# Patient Record
Sex: FEMALE | Race: WHITE | NOT HISPANIC OR LATINO | URBAN - METROPOLITAN AREA
[De-identification: names, ages, dates, MRNs, and addresses within clinical notes are randomized per-mention and may not be internally consistent; named-entity substitution may affect disease eponyms.]

---

## 2021-04-06 ENCOUNTER — BULK ORDERING (OUTPATIENT)
Dept: CASE MANAGEMENT | Facility: OTHER | Age: 51
End: 2021-04-06

## 2021-04-06 DIAGNOSIS — Z23 IMMUNIZATION DUE: ICD-10-CM

## 2022-02-21 ENCOUNTER — PRE-PROCEDURE SCREENING (OUTPATIENT)
Dept: GASTROENTEROLOGY | Facility: CLINIC | Age: 52
End: 2022-02-21

## 2022-02-21 ENCOUNTER — PREP FOR SURGERY (OUTPATIENT)
Dept: OTHER | Facility: HOSPITAL | Age: 52
End: 2022-02-21

## 2022-02-21 DIAGNOSIS — Z12.11 ENCOUNTER FOR SCREENING FOR MALIGNANT NEOPLASM OF COLON: Primary | ICD-10-CM

## 2022-02-21 RX ORDER — SODIUM CHLORIDE, SODIUM LACTATE, POTASSIUM CHLORIDE, CALCIUM CHLORIDE 600; 310; 30; 20 MG/100ML; MG/100ML; MG/100ML; MG/100ML
30 INJECTION, SOLUTION INTRAVENOUS CONTINUOUS
Status: CANCELLED | OUTPATIENT
Start: 2022-04-18

## 2022-02-21 NOTE — TELEPHONE ENCOUNTER
Colonoscopy screening-- No personal history of polyps-- No family history of polyps or colon cancer--Aspirin--Medications:        aspirin 81 MG chewable tablet  escitalopram (LEXAPRO) 5 MG tablet  levothyroxine (SYNTHROID, LEVOTHROID) 100 MCG tablet  valACYclovir (VALTREX) 1000 MG tablet  Fish-Oil  Lexapro  Basa  D3  Women Multivitamin  Vit C  Tumeric          aspirin 81 MG chewable tablet    escitalopram (LEXAPRO) 5 MG tablet    levothyroxine (SYNTHROID, LEVOTHROID) 100 MCG tablet    valACYclovir (VALTREX) 1000 MG tablet

## 2022-02-24 ENCOUNTER — PREP FOR SURGERY (OUTPATIENT)
Dept: OTHER | Facility: HOSPITAL | Age: 52
End: 2022-02-24

## 2022-03-22 ENCOUNTER — TELEPHONE (OUTPATIENT)
Dept: GASTROENTEROLOGY | Facility: CLINIC | Age: 52
End: 2022-03-22

## 2022-03-22 NOTE — TELEPHONE ENCOUNTER
Pt called this afternoon, just curious, she and  sent in procedure papers at the same time.  Her  has already been scheduled for his procedure.  She was wondering when she would be scheduled ?

## 2022-03-25 PROBLEM — Z12.11 ENCOUNTER FOR SCREENING FOR MALIGNANT NEOPLASM OF COLON: Status: ACTIVE | Noted: 2022-03-25

## 2022-03-29 ENCOUNTER — TELEPHONE (OUTPATIENT)
Dept: GASTROENTEROLOGY | Facility: CLINIC | Age: 52
End: 2022-03-29

## 2022-03-29 NOTE — TELEPHONE ENCOUNTER
I have been trying to reach pt to get updated insurance. If patient calls back please get insurance.

## 2022-04-18 ENCOUNTER — ANESTHESIA (OUTPATIENT)
Dept: GASTROENTEROLOGY | Facility: HOSPITAL | Age: 52
End: 2022-04-18

## 2022-04-18 ENCOUNTER — ANESTHESIA EVENT (OUTPATIENT)
Dept: GASTROENTEROLOGY | Facility: HOSPITAL | Age: 52
End: 2022-04-18

## 2022-04-18 ENCOUNTER — HOSPITAL ENCOUNTER (OUTPATIENT)
Facility: HOSPITAL | Age: 52
Setting detail: HOSPITAL OUTPATIENT SURGERY
Discharge: HOME | End: 2022-04-18
Attending: INTERNAL MEDICINE | Admitting: INTERNAL MEDICINE

## 2022-04-18 VITALS
HEART RATE: 59 BPM | BODY MASS INDEX: 24.82 KG/M2 | OXYGEN SATURATION: 98 % | HEIGHT: 64 IN | SYSTOLIC BLOOD PRESSURE: 106 MMHG | WEIGHT: 145.4 LBS | RESPIRATION RATE: 16 BRPM | DIASTOLIC BLOOD PRESSURE: 66 MMHG

## 2022-04-18 DIAGNOSIS — Z12.11 ENCOUNTER FOR SCREENING FOR MALIGNANT NEOPLASM OF COLON: ICD-10-CM

## 2022-04-18 LAB
B-HCG UR QL: NEGATIVE
EXPIRATION DATE: NORMAL
INTERNAL NEGATIVE CONTROL: NORMAL
INTERNAL POSITIVE CONTROL: NORMAL
Lab: NORMAL

## 2022-04-18 PROCEDURE — 45378 DIAGNOSTIC COLONOSCOPY: CPT | Performed by: INTERNAL MEDICINE

## 2022-04-18 PROCEDURE — 25010000002 PROPOFOL 10 MG/ML EMULSION: Performed by: ANESTHESIOLOGY

## 2022-04-18 PROCEDURE — 81025 URINE PREGNANCY TEST: CPT | Performed by: INTERNAL MEDICINE

## 2022-04-18 RX ORDER — PROPOFOL 10 MG/ML
VIAL (ML) INTRAVENOUS CONTINUOUS PRN
Status: DISCONTINUED | OUTPATIENT
Start: 2022-04-18 | End: 2022-04-18 | Stop reason: SURG

## 2022-04-18 RX ORDER — SODIUM CHLORIDE, SODIUM LACTATE, POTASSIUM CHLORIDE, CALCIUM CHLORIDE 600; 310; 30; 20 MG/100ML; MG/100ML; MG/100ML; MG/100ML
30 INJECTION, SOLUTION INTRAVENOUS CONTINUOUS
Status: DISCONTINUED | OUTPATIENT
Start: 2022-04-18 | End: 2022-04-18 | Stop reason: HOSPADM

## 2022-04-18 RX ORDER — LIDOCAINE HYDROCHLORIDE 20 MG/ML
INJECTION, SOLUTION INFILTRATION; PERINEURAL AS NEEDED
Status: DISCONTINUED | OUTPATIENT
Start: 2022-04-18 | End: 2022-04-18 | Stop reason: SURG

## 2022-04-18 RX ORDER — PROPOFOL 10 MG/ML
VIAL (ML) INTRAVENOUS AS NEEDED
Status: DISCONTINUED | OUTPATIENT
Start: 2022-04-18 | End: 2022-04-18 | Stop reason: SURG

## 2022-04-18 RX ADMIN — PROPOFOL 40 MG: 10 INJECTION, EMULSION INTRAVENOUS at 09:53

## 2022-04-18 RX ADMIN — SODIUM CHLORIDE, POTASSIUM CHLORIDE, SODIUM LACTATE AND CALCIUM CHLORIDE 30 ML/HR: 600; 310; 30; 20 INJECTION, SOLUTION INTRAVENOUS at 09:45

## 2022-04-18 RX ADMIN — PROPOFOL 100 MG: 10 INJECTION, EMULSION INTRAVENOUS at 09:51

## 2022-04-18 RX ADMIN — LIDOCAINE HYDROCHLORIDE 60 MG: 20 INJECTION, SOLUTION INFILTRATION; PERINEURAL at 09:50

## 2022-04-18 RX ADMIN — Medication 200 MCG/KG/MIN: at 09:51

## 2022-04-18 NOTE — H&P
[facility]  Pre Procedure History & Physical    Chief Complaint:   Time for my colonoscopy    Subjective     HPI:   51 y.o. female presenting for average risk screening.  No prior cscope.  No current GI issues.  No family hx of colon cancer or polyps.        Past Medical History:   Past Medical History:   Diagnosis Date   • Anxiety    • Disease of thyroid gland     hypothyroidism       Family History:  No family history on file.    Social History:   reports that she has never smoked. She does not have any smokeless tobacco history on file. She reports current alcohol use.    Medications:   Medications Prior to Admission   Medication Sig Dispense Refill Last Dose   • aspirin 81 MG chewable tablet Chew 81 mg daily.      • escitalopram (LEXAPRO) 5 MG tablet Take 5 mg by mouth daily.      • levothyroxine (SYNTHROID, LEVOTHROID) 100 MCG tablet Take 100 mcg by mouth daily.      • valACYclovir (VALTREX) 1000 MG tablet 2 PO Q12 4 tablet 5        Allergies:  Patient has no known allergies.    ROS:    Pertinent items are noted in HPI     Objective     not currently breastfeeding.    Physical Exam   Constitutional: Pt is oriented to person, place, and time and well-developed, well-nourished, and in no distress.   Abdominal: Soft.   Psychiatric: Mood, memory, affect and judgment normal.     Assessment/Plan     Diagnosis:  Encounter for screening for colon cancer    Anticipated Surgical Procedure:  Colonoscopy    The risks, benefits, and alternatives of this procedure have been discussed with the patient or the responsible party- the patient understands and agrees to proceed.

## 2022-04-18 NOTE — ANESTHESIA POSTPROCEDURE EVALUATION
"Patient: Ana Smith    Procedure Summary     Date: 04/18/22 Room / Location:  [facility]    Anesthesia Start: 0947 Anesthesia Stop: 1011    Procedure: COLONOSCOPY INTO CECUM (N/A ) Diagnosis:       Encounter for screening for malignant neoplasm of colon      (Encounter for screening for malignant neoplasm of colon [Z12.11])    Surgeons: Saqib Calle MD Provider: Jonah Tolentino MD    Anesthesia Type: MAC ASA Status: 2          Anesthesia Type: MAC    Vitals  Vitals Value Taken Time   BP 99/60 04/18/22 1013   Temp     Pulse 58 04/18/22 1013   Resp 16 04/18/22 1013   SpO2 97 % 04/18/22 1013           Post Anesthesia Care and Evaluation    Patient location during evaluation: bedside  Patient participation: complete - patient participated  Level of consciousness: awake and alert  Pain management: adequate  Airway patency: patent  Anesthetic complications: No anesthetic complications  PONV Status: none  Cardiovascular status: acceptable  Respiratory status: acceptable  Hydration status: acceptable    Comments: BP 99/60 (BP Location: Left arm, Patient Position: Lying)   Pulse 58   Resp 16   Ht 162.6 cm (64\")   Wt 66 kg (145 lb 6.4 oz)   SpO2 97%   BMI 24.96 kg/m²         "

## 2022-04-18 NOTE — ANESTHESIA PREPROCEDURE EVALUATION
Anesthesia Evaluation     Patient summary reviewed and Nursing notes reviewed   no history of anesthetic complications:  NPO Solid Status: > 8 hours  NPO Liquid Status: > 8 hours           Airway   Mallampati: II  TM distance: >3 FB  Neck ROM: full  No difficulty expected  Dental - normal exam     Pulmonary    (-) rhonchi, decreased breath sounds, wheezes, not a smoker  Cardiovascular   Exercise tolerance: good (4-7 METS)    Rhythm: regular  Rate: normal    (-) hypertension, CAD, angina, TEJADA, murmur      Neuro/Psych  (+) psychiatric history Anxiety,    (-) CVA  GI/Hepatic/Renal/Endo    (+)   thyroid problem hypothyroidism  (-) no renal disease, diabetes    Musculoskeletal     Abdominal     Abdomen: soft.   Substance History      OB/GYN          Other                        Anesthesia Plan    ASA 2     MAC   total IV anesthesia  intravenous induction     Anesthetic plan, all risks, benefits, and alternatives have been provided, discussed and informed consent has been obtained with: patient.        CODE STATUS:

## 2024-12-23 ENCOUNTER — OFFICE VISIT (OUTPATIENT)
Dept: FAMILY MEDICINE CLINIC | Facility: CLINIC | Age: 54
End: 2024-12-23
Payer: COMMERCIAL

## 2024-12-23 VITALS
OXYGEN SATURATION: 96 % | TEMPERATURE: 97.3 F | HEIGHT: 64 IN | DIASTOLIC BLOOD PRESSURE: 66 MMHG | BODY MASS INDEX: 21.44 KG/M2 | SYSTOLIC BLOOD PRESSURE: 110 MMHG | HEART RATE: 72 BPM | WEIGHT: 125.6 LBS

## 2024-12-23 DIAGNOSIS — Z00.00 HEALTHCARE MAINTENANCE: ICD-10-CM

## 2024-12-23 DIAGNOSIS — E78.00 PURE HYPERCHOLESTEROLEMIA: ICD-10-CM

## 2024-12-23 DIAGNOSIS — E04.2 MULTIPLE THYROID NODULES: ICD-10-CM

## 2024-12-23 DIAGNOSIS — F41.9 ANXIETY: ICD-10-CM

## 2024-12-23 DIAGNOSIS — Z23 NEED FOR VACCINATION: ICD-10-CM

## 2024-12-23 DIAGNOSIS — E06.3 HYPOTHYROIDISM DUE TO HASHIMOTO THYROIDITIS: Primary | ICD-10-CM

## 2024-12-23 PROBLEM — D68.61 APL (ANTIPHOSPHOLIPID SYNDROME): Status: ACTIVE | Noted: 2024-12-23

## 2024-12-23 PROBLEM — B00.2 ORAL HERPES: Status: ACTIVE | Noted: 2024-12-23

## 2024-12-23 PROCEDURE — 90715 TDAP VACCINE 7 YRS/> IM: CPT | Performed by: STUDENT IN AN ORGANIZED HEALTH CARE EDUCATION/TRAINING PROGRAM

## 2024-12-23 PROCEDURE — 90471 IMMUNIZATION ADMIN: CPT | Performed by: STUDENT IN AN ORGANIZED HEALTH CARE EDUCATION/TRAINING PROGRAM

## 2024-12-23 PROCEDURE — 99204 OFFICE O/P NEW MOD 45 MIN: CPT | Performed by: STUDENT IN AN ORGANIZED HEALTH CARE EDUCATION/TRAINING PROGRAM

## 2024-12-23 RX ORDER — FLUTICASONE PROPIONATE 50 MCG
2 SPRAY, SUSPENSION (ML) NASAL DAILY
COMMUNITY

## 2024-12-23 RX ORDER — CETIRIZINE HYDROCHLORIDE 10 MG/1
10 TABLET ORAL DAILY
COMMUNITY

## 2024-12-23 RX ORDER — ASPIRIN 81 MG/1
81 TABLET ORAL DAILY
COMMUNITY

## 2024-12-23 RX ORDER — LEVOTHYROXINE SODIUM 100 UG/1
100 TABLET ORAL DAILY
COMMUNITY

## 2024-12-23 RX ORDER — LEVOTHYROXINE SODIUM 200 UG/1
TABLET ORAL
COMMUNITY
Start: 2024-11-05 | End: 2024-12-23
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
## 2025-01-02 DIAGNOSIS — E83.51 HYPOCALCEMIA: ICD-10-CM

## 2025-01-02 DIAGNOSIS — R79.89 LOW SERUM TOTAL PROTEIN LEVEL: Primary | ICD-10-CM

## 2025-01-03 DIAGNOSIS — E83.51 HYPOCALCEMIA: ICD-10-CM

## 2025-01-03 DIAGNOSIS — R79.89 LOW SERUM TOTAL PROTEIN LEVEL: ICD-10-CM

## 2025-01-07 PROBLEM — J30.89 NON-SEASONAL ALLERGIC RHINITIS: Status: ACTIVE | Noted: 2025-01-07

## 2025-01-07 PROBLEM — E04.2 MULTIPLE THYROID NODULES: Status: ACTIVE | Noted: 2025-01-07

## 2025-01-14 LAB
25(OH)D3+25(OH)D2 SERPL-MCNC: 124 NG/ML (ref 30–100)
ALBUMIN SERPL-MCNC: 4.7 G/DL (ref 3.8–4.9)
ALBUMIN/CREAT UR: <6 MG/G CREAT (ref 0–29)
APPEARANCE UR: CLEAR
BILIRUB UR QL STRIP: NEGATIVE
CA-I SERPL ISE-MCNC: 4.9 MG/DL (ref 4.5–5.6)
CALCIUM SERPL-MCNC: 9.2 MG/DL (ref 8.7–10.2)
COLOR UR: YELLOW
CREAT SERPL-MCNC: 0.88 MG/DL (ref 0.57–1)
CREAT UR-MCNC: 52.7 MG/DL
EGFRCR SERPLBLD CKD-EPI 2021: 79 ML/MIN/1.73
GLOBULIN SER CALC-MCNC: 2.2 G/DL (ref 1.5–4.5)
GLUCOSE UR QL STRIP: NEGATIVE
HGB UR QL STRIP: NEGATIVE
INTACT PTH: NORMAL
KETONES UR QL STRIP: NEGATIVE
LEUKOCYTE ESTERASE UR QL STRIP: NEGATIVE
MAGNESIUM SERPL-MCNC: 2.1 MG/DL (ref 1.6–2.3)
MICRO URNS: NORMAL
MICROALBUMIN UR-MCNC: <3 UG/ML
NITRITE UR QL STRIP: NEGATIVE
PH UR STRIP: 6.5 [PH] (ref 5–7.5)
PHOSPHATE SERPL-MCNC: 4.1 MG/DL (ref 3–4.3)
PROT SERPL-MCNC: 6.9 G/DL (ref 6–8.5)
PROT UR QL STRIP: NEGATIVE
PTH-INTACT SERPL-MCNC: 28 PG/ML (ref 15–65)
SP GR UR STRIP: 1.01 (ref 1–1.03)
UROBILINOGEN UR STRIP-MCNC: 0.2 MG/DL (ref 0.2–1)

## 2025-01-27 ENCOUNTER — OFFICE VISIT (OUTPATIENT)
Dept: FAMILY MEDICINE CLINIC | Facility: CLINIC | Age: 55
End: 2025-01-27
Payer: COMMERCIAL

## 2025-01-27 VITALS
BODY MASS INDEX: 21.44 KG/M2 | SYSTOLIC BLOOD PRESSURE: 100 MMHG | HEART RATE: 55 BPM | OXYGEN SATURATION: 93 % | WEIGHT: 125.6 LBS | HEIGHT: 64 IN | DIASTOLIC BLOOD PRESSURE: 70 MMHG | TEMPERATURE: 97.1 F

## 2025-01-27 DIAGNOSIS — N30.80 ACUTE BACTERIAL SIMPLE CYSTITIS: Primary | ICD-10-CM

## 2025-01-27 DIAGNOSIS — B96.89 ACUTE BACTERIAL SIMPLE CYSTITIS: Primary | ICD-10-CM

## 2025-01-27 DIAGNOSIS — R35.0 URINARY FREQUENCY: ICD-10-CM

## 2025-01-27 LAB
BILIRUB BLD-MCNC: NEGATIVE MG/DL
CLARITY, POC: CLEAR
COLOR UR: YELLOW
EXPIRATION DATE: ABNORMAL
GLUCOSE UR STRIP-MCNC: NEGATIVE MG/DL
KETONES UR QL: NEGATIVE
LEUKOCYTE EST, POC: ABNORMAL
Lab: ABNORMAL
NITRITE UR-MCNC: NEGATIVE MG/ML
PH UR: 7 [PH] (ref 5–8)
PROT UR STRIP-MCNC: NEGATIVE MG/DL
RBC # UR STRIP: ABNORMAL /UL
SP GR UR: 1.01 (ref 1–1.03)
UROBILINOGEN UR QL: ABNORMAL

## 2025-01-27 PROCEDURE — 81003 URINALYSIS AUTO W/O SCOPE: CPT | Performed by: STUDENT IN AN ORGANIZED HEALTH CARE EDUCATION/TRAINING PROGRAM

## 2025-01-27 PROCEDURE — 99213 OFFICE O/P EST LOW 20 MIN: CPT | Performed by: STUDENT IN AN ORGANIZED HEALTH CARE EDUCATION/TRAINING PROGRAM

## 2025-01-27 RX ORDER — NITROFURANTOIN 25; 75 MG/1; MG/1
100 CAPSULE ORAL 2 TIMES DAILY
Qty: 10 CAPSULE | Refills: 0 | Status: SHIPPED | OUTPATIENT
Start: 2025-01-27 | End: 2025-01-31 | Stop reason: SDUPTHER

## 2025-01-27 NOTE — PROGRESS NOTES
"Chief Complaint  Urinary Tract Infection (Pt states she is having the feeling as if she can't fully empty her bladder, pain within the lower abdominal area, has been experiencing symptoms for abt 1w./)    Subjective        Ana Smith presents to [facility]  History of Present Illness    Pt here today c/o urinary frequency and suprapubic pressure x 1 week  Never had a UTI before  Says she is up all night peeing, but during the day it seems to be better  has this suprapubic pressure and has sensation that she can't quite empty her bladder, but denies overt dysuria  Pt is still having periods, LMP was 1/1, so likely going to be starting next one soon  Denies hematuria or urine appearance changes  Last intercourse was a couple days prior to onset of sx  Denies any fevers/chills or flank pain      Objective   Vital Signs:  /70   Pulse 55   Temp 97.1 °F (36.2 °C)   Ht 162.6 cm (64\")   Wt 57 kg (125 lb 9.6 oz)   SpO2 93%   BMI 21.56 kg/m²   Estimated body mass index is 21.56 kg/m² as calculated from the following:    Height as of this encounter: 162.6 cm (64\").    Weight as of this encounter: 57 kg (125 lb 9.6 oz).    BMI is within normal parameters. No other follow-up for BMI required.      Physical Exam  Constitutional:       General: She is not in acute distress.     Appearance: Normal appearance. She is not ill-appearing.   HENT:      Head: Normocephalic and atraumatic.   Eyes:      Extraocular Movements: Extraocular movements intact.   Cardiovascular:      Rate and Rhythm: Normal rate.   Pulmonary:      Effort: Pulmonary effort is normal.   Abdominal:      General: Abdomen is flat. There is no distension.      Palpations: Abdomen is soft.      Tenderness: There is abdominal tenderness (positive for suprapubic tenderness to palpation). There is no right CVA tenderness or left CVA tenderness.   Neurological:      Mental Status: She is alert.        Result Review :           "      Brief Urine Lab Results  (Last result in the past 365 days)        Color   Clarity   Blood   Leuk Est   Nitrite   Protein   CREAT   Urine HCG        01/27/25 1346 Yellow   Clear   Small   Moderate (2+)   Negative   Negative                     Assessment and Plan   Diagnoses and all orders for this visit:    1. Acute bacterial simple cystitis (Primary)  -Pt advised hx and sx suggestive of simple UTI, will tx empirically and send urine off for cx  -Counseled on maintaining good hydration and general measures of urinating after sex    -     Urine Culture - Urine, Urine, Clean Catch  -     nitrofurantoin, macrocrystal-monohydrate, (Macrobid) 100 MG capsule; Take 1 capsule by mouth 2 (Two) Times a Day for 5 days.  Dispense: 10 capsule; Refill: 0    2. Urinary frequency  -     POCT urinalysis dipstick, automated           Follow Up   Return if symptoms worsen or fail to improve.  Patient was given instructions and counseling regarding her condition or for health maintenance advice. Please see specific information pulled into the AVS if appropriate.

## 2025-01-31 ENCOUNTER — TELEPHONE (OUTPATIENT)
Dept: FAMILY MEDICINE CLINIC | Facility: CLINIC | Age: 55
End: 2025-01-31
Payer: COMMERCIAL

## 2025-01-31 DIAGNOSIS — B96.89 ACUTE BACTERIAL SIMPLE CYSTITIS: Primary | ICD-10-CM

## 2025-01-31 DIAGNOSIS — N30.80 ACUTE BACTERIAL SIMPLE CYSTITIS: Primary | ICD-10-CM

## 2025-01-31 RX ORDER — NITROFURANTOIN 25; 75 MG/1; MG/1
100 CAPSULE ORAL 2 TIMES DAILY
Qty: 4 CAPSULE | Refills: 0 | Status: SHIPPED | OUTPATIENT
Start: 2025-01-31 | End: 2025-02-02

## 2025-01-31 NOTE — TELEPHONE ENCOUNTER
Caller: Kimani Smithn    Relationship: Self    Best call back number: [phone]     What medication are you requesting:      What are your current symptoms:      How long have you been experiencing symptoms:      Have you had these symptoms before:    [] Yes  [] No    Have you been treated for these symptoms before:   [] Yes  [] No    If a prescription is needed, what is your preferred pharmacy and phone number:  [facility] [id number] [facility] - [address] [facility]  AT [address] [phone] [facility] [phone]      Additional notes: PATIENT CALLED SHE WAS SEEN ON MONDAY 1/27/25 AND HAD UTI WHICH DR CHAWLA GIVEN HER MEDICATION.  TODAY IS HER LAST DOSE OF MEDICATION BUT SHE IS STILL HAVING SYMPTOMS PAIN WHEN URINATING, PRESSURE AND FULLNESS NOT ABLE TO EMPTY BLADDER.  WANTS TO KNOW IF SHE NEEDS MORE MEDICATION DUE TO GOING BE THE WEEKEND.  PLEASE CALL AND LET HER KNOW.  SHE WAS FEELING BETTER AND NOW THE SYMPTOMS ARE BACK.

## 2025-01-31 NOTE — TELEPHONE ENCOUNTER
Called and spoke to patient regarding this concern.  Discussed that I checked with lab regarding urine culture results not yet received.   in clinic called and confirmed that the urine culture was received and testing has been initiated, but was not given a report of current growth versus no growth to date.  Therefore, without urine culture results to guide next steps, discussed with patient it will be okay to extend the course by 2 days to cover her for the weekend.  However, if symptoms persist beyond this-especially if urine culture is negative-then we should have a follow-up appointment to evaluate other possible causes of her symptoms, such as OAB, interstitial cystitis, pelvic organ involvement, etc.  Patient agreeable to this plan.

## 2025-02-01 LAB
BACTERIA UR CULT: NORMAL
BACTERIA UR CULT: NORMAL

## 2025-02-07 ENCOUNTER — TELEPHONE (OUTPATIENT)
Dept: FAMILY MEDICINE CLINIC | Facility: CLINIC | Age: 55
End: 2025-02-07
Payer: COMMERCIAL

## 2025-02-07 DIAGNOSIS — A49.1 STREPTOCOCCUS VIRIDANS INFECTION: ICD-10-CM

## 2025-02-07 DIAGNOSIS — B96.89 ACUTE BACTERIAL SIMPLE CYSTITIS: Primary | ICD-10-CM

## 2025-02-07 DIAGNOSIS — N30.80 ACUTE BACTERIAL SIMPLE CYSTITIS: Primary | ICD-10-CM

## 2025-02-07 RX ORDER — AMOXICILLIN 875 MG/1
875 TABLET, COATED ORAL 2 TIMES DAILY
Qty: 10 TABLET | Refills: 0 | Status: SHIPPED | OUTPATIENT
Start: 2025-02-07 | End: 2025-02-12

## 2025-02-07 NOTE — TELEPHONE ENCOUNTER
Called and spoke to patient. Discussed that a reasonable alternative to the ceftriaxone would be a 5-day course of amoxicillin, which should effectively treat the strep viridans that grew out in her urine culture. Counseled if no improvement after completion of this course, then I do recommend scheduling a follow-up appointment so we can investigate alternate causes of her symptoms. Patient agreeable to this plan.      Diagnosis Plan   1. Acute bacterial simple cystitis  amoxicillin (AMOXIL) 875 MG tablet      2. Streptococcus viridans infection  amoxicillin (AMOXIL) 875 MG tablet

## 2025-02-07 NOTE — TELEPHONE ENCOUNTER
Patient calling requesting a same day visit with , UTI symptoms still persist. Patient requesting an injection of antibiotics. Please advise.

## 2025-02-21 ENCOUNTER — TELEPHONE (OUTPATIENT)
Dept: FAMILY MEDICINE CLINIC | Facility: CLINIC | Age: 55
End: 2025-02-21
Payer: COMMERCIAL

## 2025-02-21 DIAGNOSIS — Z12.31 ENCOUNTER FOR SCREENING MAMMOGRAM FOR MALIGNANT NEOPLASM OF BREAST: Primary | ICD-10-CM

## 2025-02-21 NOTE — TELEPHONE ENCOUNTER
Caller: Ana Smith    Relationship: Self    Best call back number: [phone]    What orders are you requesting (i.e. lab or imaging): MAMMOGRAM    In what timeframe would the patient need to come in: ASAP     Where will you [facility] your lab/imaging [facility]     Additional notes: PLEASE SEND

## 2025-04-03 DIAGNOSIS — E04.2 MULTIPLE THYROID NODULES: Primary | ICD-10-CM

## 2025-04-16 ENCOUNTER — HOSPITAL ENCOUNTER (OUTPATIENT)
Dept: ULTRASOUND IMAGING | Facility: HOSPITAL | Age: 55
Discharge: HOME | End: 2025-04-16
Admitting: STUDENT IN AN ORGANIZED HEALTH CARE EDUCATION/TRAINING PROGRAM
Payer: COMMERCIAL

## 2025-04-16 DIAGNOSIS — E04.2 MULTIPLE THYROID NODULES: ICD-10-CM

## 2025-04-16 PROCEDURE — 76536 US EXAM OF HEAD AND NECK: CPT

## 2025-05-28 ENCOUNTER — OFFICE VISIT (OUTPATIENT)
Dept: FAMILY MEDICINE CLINIC | Facility: CLINIC | Age: 55
End: 2025-05-28
Payer: COMMERCIAL

## 2025-05-28 VITALS
BODY MASS INDEX: 21.51 KG/M2 | TEMPERATURE: 100.2 F | WEIGHT: 126 LBS | SYSTOLIC BLOOD PRESSURE: 110 MMHG | OXYGEN SATURATION: 99 % | DIASTOLIC BLOOD PRESSURE: 68 MMHG | HEIGHT: 64 IN | RESPIRATION RATE: 16 BRPM | HEART RATE: 65 BPM

## 2025-05-28 DIAGNOSIS — H92.02 DISCOMFORT OF LEFT EAR: Primary | ICD-10-CM

## 2025-05-28 DIAGNOSIS — H91.92 DECREASED HEARING OF LEFT EAR: ICD-10-CM

## 2025-05-28 PROCEDURE — 99213 OFFICE O/P EST LOW 20 MIN: CPT | Performed by: STUDENT IN AN ORGANIZED HEALTH CARE EDUCATION/TRAINING PROGRAM

## 2025-05-28 NOTE — PROGRESS NOTES
"Chief Complaint  Ear Fullness (Feels like vibration, deep  x 7 days left ear. )    Subjective        Ana Smith presents to [facility]  History of Present Illness  History of Present Illness      The patient presents with left ear discomfort and associated hearing abnormality.    - Sensation of fullness in the left ear for 2.5 weeks  - Preceded by a week of severe headaches  - Initially attributed to allergies, despite no concurrent cough, sneeze, or congestion  - Headaches resolved 1 week ago, but ear discomfort persisted  - Describes combination of muffled and amplified sounds with a vibrating sensation that occurs intermittently, most prominent when in a venue with lots of background noise, such as a restaurant  - For her year-round allergies, she has continued using Flonase 2 sprays each nostril daily plus cetirizine  - She also tried Sudafed for 4-5 days without relief  - modified Valsalva maneuver affected right ear only  - No recent water exposure, Q-tip use, or ear insertion  - Symptoms began 1.5 weeks after a flight  - No ear drainage or pain      Objective   Vital Signs:  /68   Pulse 65   Temp 100.2 °F (37.9 °C)   Resp 16   Ht 162.6 cm (64.02\")   Wt 57.2 kg (126 lb)   SpO2 99%   BMI 21.62 kg/m²   Estimated body mass index is 21.62 kg/m² as calculated from the following:    Height as of this encounter: 162.6 cm (64.02\").    Weight as of this encounter: 57.2 kg (126 lb).    BMI is within normal parameters. No other follow-up for BMI required.      Physical Exam  Constitutional:       General: She is not in acute distress.     Appearance: Normal appearance. She is not ill-appearing.   HENT:      Head: Normocephalic and atraumatic.      Right Ear: Tympanic membrane, ear canal and external ear normal. There is no impacted cerumen.      Left Ear: Ear canal and external ear normal. There is no impacted cerumen.      Ears:      Comments: There is a very small " circular-shaped dark lesion immediately adjacent to and partially obscured by the handle of malleus at the 11 o'clock position; L TM otherwise normal with no bulging, erythema, or effusions     Nose: Congestion (mild nasal congestion b/l) present. No rhinorrhea.   Eyes:      Extraocular Movements: Extraocular movements intact.   Cardiovascular:      Rate and Rhythm: Normal rate.   Pulmonary:      Effort: Pulmonary effort is normal.   Neurological:      Mental Status: She is alert.        Physical Exam      Result Review :         Results             Assessment and Plan   Diagnoses and all orders for this visit:    1. Discomfort of left ear (Primary)  2. Decreased hearing of left ear  - Pt advised physical exam suggestive of possible, small left TM perforation v dark cerumen adhered to L TM v another TM lesion of unknown etiology. Pt's reported sx of combination of muffled hearing with amplified/vibration sensation would suggest TM perforation. But given pex not able to verify and given concern for hearing loss, I do recommend ENT evaluation for otoscopic examination and formal audiometry  - Patient otherwise advised to avoid any water in ear, continue her daily allergy treatments (Flonase plus cetirizine), and follow-up in 1 week for reevaluation    -     Ambulatory Referral to ENT (Otolaryngology)        Assessment & Plan  Potential tympanic membrane perforation  - Symptoms suggest possible tympanic membrane perforation causing unusual hearing experiences  - Small dark spot observed could be cerumen or minor perforation, likely healing  - No fluid or infection  - Continue current allergy management  - Avoid water exposure to affected ear  - Refer to ENT for further evaluation and potential hearing testing  - If perforation affects hearing post-healing, audiological assessment and ENT evaluation needed    Follow-up  - Follow-up in 1-2 weeks         Follow Up   Return in about 1 week (around 6/4/2025) for f/u L ear  congestion/hearing.  Patient was given instructions and counseling regarding her condition or for health maintenance advice. Please see specific information pulled into the AVS if appropriate.     Patient or patient representative verbalized consent for the use of Ambient Listening during the visit with  Phylicia Roy MD for chart documentation. 5/28/2025  17:16 EDT

## 2025-06-05 ENCOUNTER — OFFICE VISIT (OUTPATIENT)
Dept: FAMILY MEDICINE CLINIC | Facility: CLINIC | Age: 55
End: 2025-06-05
Payer: COMMERCIAL

## 2025-06-05 VITALS
BODY MASS INDEX: 21.48 KG/M2 | OXYGEN SATURATION: 93 % | SYSTOLIC BLOOD PRESSURE: 90 MMHG | TEMPERATURE: 98.2 F | HEART RATE: 68 BPM | HEIGHT: 64 IN | DIASTOLIC BLOOD PRESSURE: 66 MMHG | WEIGHT: 125.8 LBS

## 2025-06-05 DIAGNOSIS — H91.92 DECREASED HEARING OF LEFT EAR: ICD-10-CM

## 2025-06-05 DIAGNOSIS — H72.02 TYMPANIC MEMBRANE CENTRAL PERFORATION, LEFT: Primary | ICD-10-CM

## 2025-06-05 PROCEDURE — 99213 OFFICE O/P EST LOW 20 MIN: CPT | Performed by: STUDENT IN AN ORGANIZED HEALTH CARE EDUCATION/TRAINING PROGRAM

## 2025-06-05 NOTE — PROGRESS NOTES
"Chief Complaint  Follow-up (Pt is her for 1w f/u on the ear congestion and hearing, pt has no new complaints/) and Ear Fullness (1w f/u)    Subjective        Ana Smith presents to [facility]  History of Present Illness  History of Present Illness    The patient presents for evaluation of left ear hearing abnormalities and possible left TM perforation:.    -Seen in clinic 1 week ago for this problem  - Since then, patient endorses significant improvement in hearing 2 days after her last visit, although she still has experienced intermittent clogging and occasional popping sensations in her ear, attributed to allergies  - The echo/vibrating/amplification symptom did return in less severity a couple of days ago in correlation with worsening allergy symptoms  - She continues regular use of Flonase 2 sprays each nostril and daily cetirizine  - Unable to get an appointment with ENT this week, but has an appointment scheduled for next Wednesday 6/11      Objective   Vital Signs:  BP 90/66   Pulse 68   Temp 98.2 °F (36.8 °C)   Ht 162.6 cm (64.02\")   Wt 57.1 kg (125 lb 12.8 oz)   SpO2 93%   BMI 21.58 kg/m²   Estimated body mass index is 21.58 kg/m² as calculated from the following:    Height as of this encounter: 162.6 cm (64.02\").    Weight as of this encounter: 57.1 kg (125 lb 12.8 oz).    BMI is within normal parameters. No other follow-up for BMI required.      Physical Exam  Constitutional:       General: She is not in acute distress.     Appearance: Normal appearance. She is not ill-appearing.   HENT:      Head: Normocephalic and atraumatic.      Right Ear: Tympanic membrane, ear canal and external ear normal. There is no impacted cerumen.      Left Ear: Ear canal and external ear normal. There is no impacted cerumen.      Ears:      Comments:  There is a very small circular-shaped dark lesion immediately adjacent to and partially obscured by the handle of malleus at the 11 " o'clock position that is slightly smaller compared to exam 1 week ago; L TM otherwise normal with no bulging, erythema, or effusions  Eyes:      Extraocular Movements: Extraocular movements intact.   Cardiovascular:      Rate and Rhythm: Normal rate.   Pulmonary:      Effort: Pulmonary effort is normal.   Neurological:      Mental Status: She is alert.        Physical Exam      Result Review :         Results             Assessment and Plan   Diagnoses and all orders for this visit:    1. Tympanic membrane central perforation, left (Primary)  2. Decreased hearing of left ear  - Patient advised I was better able to visualize left TM lesion, which has decreased in size since exam 1 week ago, and does have appearance consistent with TM perforation, although as noted in exam, it is partially obscured by the handle of malleus  - Given patient endorses significant headache/deep pressure sensation for about 1 week, that spontaneously resolved followed by new onset hearing abnormalities, this history is consistent with TM perforation  - Even though symptoms significantly improved, patient advised would be worthwhile to keep appointment with ENT and consider hearing testing for verification  - Otherwise, continue management of allergic rhinitis with daily Flonase and cetirizine  - Continue to avoid water in that ear until ENT evaluation    Assessment & Plan           Follow Up   Return if symptoms worsen or fail to improve.  Patient was given instructions and counseling regarding her condition or for health maintenance advice. Please see specific information pulled into the AVS if appropriate.     Patient or patient representative verbalized consent for the use of Ambient Listening during the visit with  Phylicia Roy MD for chart documentation. 6/5/2025  18:13 EDT

## 2025-06-27 ENCOUNTER — OFFICE VISIT (OUTPATIENT)
Dept: FAMILY MEDICINE CLINIC | Facility: CLINIC | Age: 55
End: 2025-06-27
Payer: COMMERCIAL

## 2025-06-27 VITALS
BODY MASS INDEX: 21.41 KG/M2 | HEART RATE: 63 BPM | TEMPERATURE: 97.8 F | DIASTOLIC BLOOD PRESSURE: 60 MMHG | WEIGHT: 125.4 LBS | OXYGEN SATURATION: 98 % | HEIGHT: 64 IN | SYSTOLIC BLOOD PRESSURE: 92 MMHG

## 2025-06-27 DIAGNOSIS — Z00.00 HEALTHCARE MAINTENANCE: ICD-10-CM

## 2025-06-27 DIAGNOSIS — E83.51 HYPOCALCEMIA: ICD-10-CM

## 2025-06-27 DIAGNOSIS — E78.00 PURE HYPERCHOLESTEROLEMIA: Primary | ICD-10-CM

## 2025-06-27 DIAGNOSIS — E06.3 HYPOTHYROIDISM DUE TO HASHIMOTO THYROIDITIS: ICD-10-CM

## 2025-06-27 DIAGNOSIS — B00.1 HERPES SIMPLEX LABIALIS: ICD-10-CM

## 2025-06-27 DIAGNOSIS — F41.9 ANXIETY: ICD-10-CM

## 2025-06-27 RX ORDER — ESCITALOPRAM OXALATE 5 MG/1
5 TABLET ORAL DAILY
Qty: 90 TABLET | Refills: 3 | Status: SHIPPED | OUTPATIENT
Start: 2025-06-27

## 2025-06-27 RX ORDER — METHYLPREDNISOLONE 4 MG/1
TABLET ORAL
COMMUNITY
Start: 2025-06-11 | End: 2025-06-27

## 2025-06-27 RX ORDER — VALACYCLOVIR HYDROCHLORIDE 1 G/1
1000 TABLET, FILM COATED ORAL DAILY
Qty: 5 TABLET | Refills: 5 | Status: SHIPPED | OUTPATIENT
Start: 2025-06-27

## 2025-06-27 RX ORDER — LEVOTHYROXINE SODIUM 100 UG/1
100 TABLET ORAL DAILY
Qty: 90 TABLET | Refills: 3 | Status: SHIPPED | OUTPATIENT
Start: 2025-06-27

## 2025-06-27 RX ORDER — DOXYCYCLINE 100 MG/1
100 CAPSULE ORAL 2 TIMES DAILY
COMMUNITY
Start: 2025-03-28 | End: 2025-06-27

## 2025-06-27 NOTE — PROGRESS NOTES
Chief Complaint  Weight Loss, Hypothyroidism, and Follow-up (Pt is present to f/u on the previous dx/concerns. Pt has no new complaints for today's visit. )    Subjective        Ana Smith presents to [facility]  History of Present Illness  History of Present Illness    Pt presents for f/u on general health and recent L ear symptoms.    #PAST HISTORY  PMHx:  Antiphospholipid syndrome w/o VTE - on ASA  Hashimoto's  B/l thyroid nodules - last US 04/2025 showed stable, rec repeat in 1 yr  Anxiety - on Lexapro since anxiety assoc w/ multiple miscarriages   Oral HSV   HLD  Year-round allergic rhinitis  Thyroid nodules - s/p US     #L Ear Fullness and hearing change  - Seen in office for this complaint on 5/28 and again on 6/5, suspected possible small/healing perforated TM, but advised ENT eval for audiology exam  - Pt saw ENT on 6/17, reports no appreciable TM perforation  - Hearing test was normal.  - Completed Medrol Dosepak with resolution of echo and muffled hearing symptoms.  - ENT suggested vestibular neuritis and advised follow-up in a month if symptoms persist.  ENT - SCAN - EAR PROBLEM, [facility],[id number] (06/17/2025)     #Vitamin D Intake  - Reduced vitamin D intake to 1000 units daily and is considering further reduction.  - plan for Vit D recheck today    #Weight Management  - On semaglutide 0.75 mg from a weight loss clinic, unchanged for over a year.  - Weight stable for the past year with 20-25 pounds total loss and 4-5 pound fluctuations considered normal.    #Antiphospholipid syndrome w/o VTE - on ASA  - Continues daily aspirin    #Anxiety  - stable on Lexapro.  - Requests refills for Lexapro    #Hashimoto's  #Thyroid Nodule  - requests refill levothyroxine.  - Thyroid ultrasound showed stable nodules, next evaluation due April.    #HLD  - History of high cholesterol improved with weight loss.  - Total cholesterol previously over 200, -150, and consistently  "high HDL.    #HCM  Last Mammo: 03/2025  Last pap: 06/2025 (results pending)  Colonoscopy: 2022 (Q 10 yrs)        Objective   Vital Signs:  BP 92/60   Pulse 63   Temp 97.8 °F (36.6 °C)   Ht 162.6 cm (64.02\")   Wt 56.9 kg (125 lb 6.4 oz)   SpO2 98%   BMI 21.51 kg/m²   Estimated body mass index is 21.51 kg/m² as calculated from the following:    Height as of this encounter: 162.6 cm (64.02\").    Weight as of this encounter: 56.9 kg (125 lb 6.4 oz).    BMI is within normal parameters. No other follow-up for BMI required.      Physical Exam  Constitutional:       General: She is not in acute distress.     Appearance: Normal appearance. She is not ill-appearing.   HENT:      Head: Normocephalic and atraumatic.      Right Ear: Tympanic membrane, ear canal and external ear normal. There is no impacted cerumen.      Left Ear: Tympanic membrane, ear canal and external ear normal. There is no impacted cerumen.   Eyes:      Extraocular Movements: Extraocular movements intact.   Cardiovascular:      Rate and Rhythm: Normal rate and regular rhythm.      Heart sounds: Normal heart sounds. No murmur heard.  Pulmonary:      Effort: Pulmonary effort is normal.   Neurological:      Mental Status: She is alert.        Physical Exam      Result Review :         Results             Assessment and Plan   Diagnoses and all orders for this visit:    1. Pure hypercholesterolemia (Primary)  -     Lipid Panel    2. Hypothyroidism due to Hashimoto thyroiditis  -     levothyroxine (SYNTHROID, LEVOTHROID) 100 MCG tablet; Take 1 tablet by mouth Daily.  Dispense: 90 tablet; Refill: 3    3. Hypocalcemia  -     Comprehensive Metabolic Panel  -     Magnesium  -     Phosphorus  -     Vitamin D,25-Hydroxy    4. Anxiety  -     escitalopram (LEXAPRO) 5 MG tablet; Take 1 tablet by mouth Daily.  Dispense: 90 tablet; Refill: 3    5. Herpes simplex labialis  -     valACYclovir (Valtrex) 1000 MG tablet; Take 1 tablet by mouth Daily. Take at first sign of " outbreak for total of 5 days  Dispense: 5 tablet; Refill: 5    6. Healthcare maintenance        Assessment & Plan  Vestibular neuritis  - Hearing test normal, no visible tympanic membrane issue  - ENT's diagnosis and treatment appropriate  - Symptoms resolved post Medrol Dosepak  - No further ENT follow-up unless symptoms recur    Health maintenance  - Mammogram in 03/2025, Pap smear this week (results pending)  - Colon cancer screening due 2032    Hypothyroidism  - Thyroid ultrasound: Stable nodules; repeat in 04/2026  - Last TSH 12/2024 stabe  - levothyroxine 100 mcg daily refilled    Hypocalcemia  - Blood work: Slightly low calcium (normalized), elevated vitamin D (reduced dosage)  - Recheck calcium, vitamin D    Pure hypercholesterolemia   -Recheck cholesterol today    Anxiety  - Well-controlled, continue Lexapro 5 mg daily  - Refill Lexapro, levothyroxine, and valacyclovir (5-7 days during breakout, refill as needed)           Follow Up   Return in about 1 year (around 6/27/2026) for Annual Physical or sooner as needed.  Patient was given instructions and counseling regarding her condition or for health maintenance advice. Please see specific information pulled into the AVS if appropriate.     Patient or patient representative verbalized consent for the use of Ambient Listening during the visit with  Phylicia Roy MD for chart documentation. 7/17/2025  08:20 EDT

## 2025-06-28 LAB
25(OH)D3+25(OH)D2 SERPL-MCNC: 79.6 NG/ML (ref 30–100)
ALBUMIN SERPL-MCNC: 4 G/DL (ref 3.5–5.2)
ALBUMIN/GLOB SERPL: 1.9 G/DL
ALP SERPL-CCNC: 33 U/L (ref 39–117)
ALT SERPL-CCNC: 14 U/L (ref 1–33)
AST SERPL-CCNC: 19 U/L (ref 1–32)
BILIRUB SERPL-MCNC: 0.3 MG/DL (ref 0–1.2)
BUN SERPL-MCNC: 15 MG/DL (ref 6–20)
BUN/CREAT SERPL: 18.8 (ref 7–25)
CALCIUM SERPL-MCNC: 8.8 MG/DL (ref 8.6–10.5)
CHLORIDE SERPL-SCNC: 107 MMOL/L (ref 98–107)
CHOLEST SERPL-MCNC: 199 MG/DL (ref 0–200)
CO2 SERPL-SCNC: 24 MMOL/L (ref 22–29)
CREAT SERPL-MCNC: 0.8 MG/DL (ref 0.57–1)
EGFRCR SERPLBLD CKD-EPI 2021: 88.2 ML/MIN/1.73
GLOBULIN SER CALC-MCNC: 2.1 GM/DL
GLUCOSE SERPL-MCNC: 83 MG/DL (ref 65–99)
HDLC SERPL-MCNC: 66 MG/DL (ref 40–60)
LDLC SERPL CALC-MCNC: 125 MG/DL (ref 0–100)
MAGNESIUM SERPL-MCNC: 2 MG/DL (ref 1.6–2.6)
PHOSPHATE SERPL-MCNC: 3.5 MG/DL (ref 2.5–4.5)
POTASSIUM SERPL-SCNC: 4.8 MMOL/L (ref 3.5–5.2)
PROT SERPL-MCNC: 6.1 G/DL (ref 6–8.5)
SODIUM SERPL-SCNC: 141 MMOL/L (ref 136–145)
TRIGL SERPL-MCNC: 43 MG/DL (ref 0–150)
VLDLC SERPL CALC-MCNC: 8 MG/DL (ref 5–40)

## (undated) DEVICE — SENSR O2 OXIMAX FNGR A/ 18IN NONSTR

## (undated) DEVICE — ADAPT CLN BIOGUARD AIR/H2O DISP

## (undated) DEVICE — CANN O2 ETCO2 FITS ALL CONN CO2 SMPL A/ 7IN DISP LF

## (undated) DEVICE — LN SMPL CO2 SHTRM SD STREAM W/M LUER

## (undated) DEVICE — TUBING, SUCTION, 1/4" X 10', STRAIGHT: Brand: MEDLINE

## (undated) DEVICE — KT ORCA ORCAPOD DISP STRL